# Patient Record
Sex: MALE | Race: WHITE
[De-identification: names, ages, dates, MRNs, and addresses within clinical notes are randomized per-mention and may not be internally consistent; named-entity substitution may affect disease eponyms.]

---

## 2017-09-01 ENCOUNTER — HOSPITAL ENCOUNTER (OUTPATIENT)
Dept: HOSPITAL 47 - OR | Age: 42
Setting detail: OBSERVATION
LOS: 1 days | Discharge: HOME | End: 2017-09-02
Payer: COMMERCIAL

## 2017-09-01 VITALS — BODY MASS INDEX: 34.2 KG/M2

## 2017-09-01 DIAGNOSIS — Z82.49: ICD-10-CM

## 2017-09-01 DIAGNOSIS — M24.572: ICD-10-CM

## 2017-09-01 DIAGNOSIS — M21.072: Primary | ICD-10-CM

## 2017-09-01 DIAGNOSIS — Z86.718: ICD-10-CM

## 2017-09-01 DIAGNOSIS — I25.2: ICD-10-CM

## 2017-09-01 DIAGNOSIS — G89.29: ICD-10-CM

## 2017-09-01 DIAGNOSIS — E78.5: ICD-10-CM

## 2017-09-01 DIAGNOSIS — I10: ICD-10-CM

## 2017-09-01 DIAGNOSIS — M25.872: ICD-10-CM

## 2017-09-01 DIAGNOSIS — Z79.899: ICD-10-CM

## 2017-09-01 DIAGNOSIS — M19.072: ICD-10-CM

## 2017-09-01 DIAGNOSIS — E66.9: ICD-10-CM

## 2017-09-01 DIAGNOSIS — Z87.891: ICD-10-CM

## 2017-09-01 DIAGNOSIS — G47.33: ICD-10-CM

## 2017-09-01 DIAGNOSIS — I25.10: ICD-10-CM

## 2017-09-01 DIAGNOSIS — Z91.14: ICD-10-CM

## 2017-09-01 PROCEDURE — 28300 INCISION OF HEEL BONE: CPT

## 2017-09-01 PROCEDURE — 27695 REPAIR OF ANKLE LIGAMENT: CPT

## 2017-09-01 PROCEDURE — 27687 REVISION OF CALF TENDON: CPT

## 2017-09-01 PROCEDURE — 64450 NJX AA&/STRD OTHER PN/BRANCH: CPT

## 2017-09-01 PROCEDURE — 73650 X-RAY EXAM OF HEEL: CPT

## 2017-09-01 PROCEDURE — 94640 AIRWAY INHALATION TREATMENT: CPT

## 2017-09-01 RX ADMIN — POTASSIUM CHLORIDE SCH MLS: 14.9 INJECTION, SOLUTION INTRAVENOUS at 11:15

## 2017-09-01 RX ADMIN — HYDROCODONE BITARTRATE AND ACETAMINOPHEN PRN EACH: 10; 325 TABLET ORAL at 22:38

## 2017-09-01 RX ADMIN — HYDROMORPHONE HYDROCHLORIDE PRN MG: 1 INJECTION, SOLUTION INTRAMUSCULAR; INTRAVENOUS; SUBCUTANEOUS at 23:29

## 2017-09-01 RX ADMIN — HYDROMORPHONE HYDROCHLORIDE PRN MG: 1 INJECTION, SOLUTION INTRAMUSCULAR; INTRAVENOUS; SUBCUTANEOUS at 16:33

## 2017-09-01 RX ADMIN — HYDROMORPHONE HYDROCHLORIDE PRN MG: 1 INJECTION, SOLUTION INTRAMUSCULAR; INTRAVENOUS; SUBCUTANEOUS at 20:28

## 2017-09-01 RX ADMIN — HYDROMORPHONE HYDROCHLORIDE PRN MG: 1 INJECTION, SOLUTION INTRAMUSCULAR; INTRAVENOUS; SUBCUTANEOUS at 16:56

## 2017-09-01 NOTE — FL
EXAMINATION TYPE: FL guidance operating room

 

DATE OF EXAM: 9/1/2017

 

HISTORY: Flouroscopy  time

 

51 seconds of fluoroscopy provided. 

 

IMPRESSION:

1. Fluoroscopy time.

## 2017-09-01 NOTE — P.ONQ
Anesthesiology Proc Note - PNB





- Peripheral Nerve Block Performed


  ** Left Popliteal Single


Time Out Performed: Yes


Indication: Acute Post-Operative Pain, Analgesia


Specifically requested for management of pain by DrPhillip: Jose Ramon Aguirre


Sedation Type: Sedate with meaningful contact maintained


Preparation: Sterile Prep


Position: Supine (Right lateral)


Catheter: None


Needle Types: Other (see comment) (stimuplex)


Needle Size: 50mm (2")


Needle Gauge: Other (see comment) (22)


Technique: Ultrasound


Injectate: Other (see comment) (30 cc 1% lido,.025% Rop,)


Adjunct: Epinephrine (see comment for dilution ratio) (1:200,000)


Blood Aspirated: No


Pain Paresthesia on Injection Noted: No


Resistance on Injection: Normal


Events: Uneventful and Well Tolerated

## 2017-09-01 NOTE — P.OP
Date of Procedure: 09/01/17


Preoperative Diagnosis: 


1.  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 

arthritis


2.  Has pain over valgus foot deformity with less than 50% uncoverage of the 

talar head


3.  Gastrocnemius equinus contracture


4.  Anterior ankle impingement


Postoperative Diagnosis: 


Same


Procedure(s) Performed: 


1.  Left deltoid ligament reconstruction with tibialis anterior allograft


2.  Left medializing calcaneal osteotomy


3.  Left ankle cheilectomy


4.  Left gastrocnemius recession


5.  Application of left short leg splint


Implants: 





Anesthesia: GETA


Surgeon: Jose Ramon Aguirre


Assistant #1: Jarrod Plaza


Estimated Blood Loss (ml): 25


IV fluids (ml): 1,600


Pathology: none sent


Condition: stable


Disposition: PACU


Indications for Procedure: 


The patient is a 42-year-old male with a medical history significant for prior 

DVT and heart disease who is had a long-standing history of problems with his 

left foot and ankle.  The patient has had multiple sprains resulting in chronic 

ankle pain.  He failed a long course of nonsurgical treatment including 

activity modifications, bracing, orthotics, NSAID pain medication, and therapy.

  He came to see me to discuss surgery.  The patient's wife works as an 

 and requested surgery.  I discussed that the patient has a 

difficult problem in that he is artery developing arthritic changes in the 

ankle and has an incompetent deltoid ligament.  The patient had x-rays and an 

MRI.  The patient also obtained weightbearing x-rays of the ankle and foot as 

well as a stress x-ray in my office.  The stress x-ray showed that the valgus 

ankle arthritis was passively correctable.  I do lengthy discussion with the 

patient and his wife on treatment.  We discussed continued nonsurgical 

treatment with custom bracing and orthotics as well as occasional injections 

and activity modification.  The patient and his wife requested surgery.  I 

discussed different treatment options including an ankle fusion versus joint 

sparing procedures.  Due to the patient's young age and since his x-rays were 

passively correctable on stress x-ray we discussed performing joint sparing 

procedures.  My recommendation was to perform a deltoid ligament reconstruction 

with tendon allograft, a medializing calcaneal osteotomy to correct his 

hindfoot valgus and protect his graft, and ankle cheilectomy to address his 

ankle impingement and a gastrocnemius recession.  We discussed the potential 

risks and complication of surgery including but not limited to risk of 

anesthesia, risk of superficial infection, risk of deep infection, risk of 

delayed wound healing, risk of damage to local blood vessels or nerves, risk of 

failure of the graft, risk of recurrent valgus deformity, risk of progression 

of arthritis, risk of continued pain, risk of worsening pain, risk of inability 

to regain preinjury level of function, risk of nonunion of the calcaneal 

osteotomy, risk of symptomatically hardware from the osteotomy, risk of damage 

to the sural nerve, risk of calf atrophy, generalized to satisfaction with 

surgery, DVT, fatal PE, postoperative medical complications, need for further 

surgery, amputation, and possibly death.  The patient and his wife understand 

that he is at a high risk of having ongoing pain due to his degenerative ankle 

findings.  They provided their verbal and written consent and requested I go 

forward with surgery.


Operative Findings: 





Description of Procedure: 


The patient was identified in preoperative holding and the correct left leg was 

marked with my initials.  I reviewed the consent form with the patient and his 

wife.  All of their questions were answered.  A preoperative popliteal and 

saphenous nerve block was applied by anesthesia.  The patient was then brought 

back to the operating room.  He was positioned on an operating room table and a 

general anesthetic and preoperative antibiotics were administered.  A 

tourniquet was applied to the proximal aspect of his left thigh.  All bony 

prominences were well-padded.  A bone foam bump was placed under his left 

buttock internally rotating his leg to neutral.  A ramp was placed under the 

left leg.  The right leg was secured to the operating room table with foam and 

tape.  The left leg was then prepped and draped in the standard sterile 

fashion.  Prior to starting surgery timeout was performed identifying the 

correct patient, operative extremity, and procedure.  The patient's leg was 

then elevated, exsanguinated with an Esmarch bandage, and the tourniquet was 

inflated to 250 mmHg.  Next





I began by performing a gastrocnemius recession.  I made a longitudinal 

incision 1 thumb breath posterior to the medial tibia at the distal medial 

muscle belly of the gastrocnemius.  Skin incision was made a 10 blade scalpel 

and dissection was carried down carefully to the subcutaneous tissue to the 

superficial fascia which was incised longitudinally in line with the skin 

incision.  I bluntly developed the interval between the gastrocnemius 

aponeurosis and superficial fascia as well as the gastroc aponeurosis and 

soleus fascia.  The sural nerve was identified to be adherent to the 

superficial fascia.  The plantaris tendon was identified and a 1 cm section was 

removed.  Modified right angle retractors were placed isolating the 

gastrocnemius aponeurosis.  I major that the sural nerve was protected.  I then 

transected the aponeurosis from medial to lateral under direct visualization.  

The wound was then copiously irrigated.  The deep subcu was reapproximated 

using 2-0 Vicryl.  The skin was closed with 3-0 nylon horizontal mattress 

stitches.





I then performed a medializing calcaneal osteotomy.  An oblique incision was 

marked out 1 thumb breadth posterior to the fibula over the lateral wall the 

calcaneus.  Skin incision with a 15 blade scalpel dissection was carried down 

carefully with tenotomy scissors to the periosteum over the lateral calcaneus.  

A key elevator was used to raise the soft tissue off the calcaneus.  Baby Keysha 

retractors were placed superior and inferior to the calcaneus.  A long 

microsagittal saw blade was used to cut the calcaneus taking care not to plunge 

medially.  A small lamina  was placed in the osteotomy to relax the 

soft tissues medially.  I then placed K wires for cannulated 45 screws in the 

tuberosity of the calcaneus up to the osteotomy site.  I verified the position 

of the K wires.  I then applied a medial force to the calcaneus and had an 

assistant drive the K wires across the osteotomy site.  An axial view was then 

obtained again showing medialization of the tuberosity and that the K wires 

were within bone.  I then measured and placed cannulated, partially-threaded, 

4.5 mm screws across the osteotomy site generating excellent compression.  I 

verified the position of the screws with both a an axial and lateral x-ray.





I then marked a longitudinal incision centered over the medial malleolus and 

extending along the medial border of the foot.  Skin incision was made a 15 

blade scalpel.  I dissected through the subcu changes tissue and elevated the 

periosteum off of the distal tibia.  I then proceeded my dissection distally.  

The sheath of the posterior tibial tendon was opened.  On inspection the 

posterior tibial tendon appeared to be relatively normal with minimal 

degenerative changes and no tearing.  I elected not to perform an FDL tendon 

transfer.  The posterior tibial tendon was then carefully retracted.  An 

anterior arthrotomy the ankle joint was then performed.  There appeared to be a 

large overhanging anterior plafond osteophyte as well as an osteophyte on the 

medial talar neck.  An osteotome was reduced to remove the impinging spurs.  

Both the superficial and deep limbs of the deltoid ligament appeared to be 

attenuated.  My assistant prepared the tibialis anterior tendon on the back 

table and placed it in a graft workstation to provide tension to the graft and 

help prevent postoperative creep of the tendon.  A K wire was then placed at 

the level of the physeal scar on the AP view and directly centered in the tibia 

on the lateral view.  A beath pin was then placed in the talus at the center of 

rotation of the ankle joint from medial to lateral and out the lateral skin.  A 

second Beath pin was placed in the calcaneus from the sustentaculum out the 

lateral aspect of the calcaneus into the skin.  The graft was then brought to 

the operating room table from the back table.  One end of the graft was placed 

into the tibia and a 6.25 mm interference screw was placed into the graft 

holding it into the tibia.  The graft appeared secure and I was not able to 

dislodge it from the tibia.  I then placed one of the limbs of the graft 

through the Beath pin and pulled the Beath pin laterally bringing the graft 

down into the talar bone tunnel after drilling a tunnel for the graft.  The 

foot was placed in maximum inversion and a 5.5 mm interference screw was placed 

holding the graft in place nicely.  Attention was then turned to the calcaneal 

tunnel.  The Beath pin was pulled through the lateral foot, pulling the other 

limb of the graft into the calcaneal tunnel.  Fluoroscopy was brought in to 

verify reduction of the valgus tilt.  Maximum inversion was held and a 5.5 mm 

interference screw was placed in the calcaneal tunnel holding the graft in 

place.  At this point fluoroscopy was brought in.  On a mortise view the talus 

appeared to be concentrically reduced within the ankle with no evidence of 

valgus tilt.  I applied gentle stress to the tendon and there did not appear to 

be any valgus instability.  I also performed varus stressing and there did not 

appear to be any varus stress.  At this point all wounds were copiously 

irrigated.  The sheath of the posterior tibial tendon was closed with a running 

0 Vicryl stitch.  The subcutaneous layer of all incisions were closed with 

interrupted 2-0 Vicryl.  Skin of all incisions were closed using 3-0 nylon 

horizontal mattress stitches.  The stab incisions on the plantar aspect of the 

heel were closed with interrupted 3-0 nylon.  A sterile dressing consisting of 

Betadine soaked Adaptic, 4 x 4, and web roll was applied.  The drapes were 

taken down and a very well-padded bulky Adkins type splint was applied.  The 

patient was then awoken from his anesthetic, transferred to the operative table 

to the [facility] and brought to PACU having tolerated the procedure well.





Jarrod SPANGLER was required as a skilled assistant for patient positioning, 

approach, preparation of the graft, placement of the graft, closure, and 

application of splint.





Plan: The patient is going to be admitted overnight for pain control and 

postoperative antibiotics due to the fact that he lives over 2 hours away.  He 

is to remain strictly nonweightbearing on his left leg in his splint at all 

times.  Due to the patient having a history of prior DVT he is going to be 

treated with Lovenox.  He was given a prescription for Lovenox to go home with 

and we'll contact his cardiologist and internist to see how long they would 

like him treated.  He will follow-up in the office in 2 weeks for splint and 

suture removal and x-rays of the left ankle including an axial heel view.

## 2017-09-01 NOTE — XR
EXAMINATION TYPE: XR calcaneus 2V LT

 

DATE OF EXAM: 9/1/2017

 

COMPARISON: NONE

 

HISTORY: Intraoperative image calcaneal fracture

 

TECHNIQUE: One view submitted

 

FINDINGS: Surgical change appears in near-anatomic alignment. There is some residual displacement of 
the posterior margin the calcaneus.

 

IMPRESSION: Intraoperative image

## 2017-09-02 VITALS — SYSTOLIC BLOOD PRESSURE: 120 MMHG | DIASTOLIC BLOOD PRESSURE: 65 MMHG | HEART RATE: 101 BPM

## 2017-09-02 VITALS — RESPIRATION RATE: 16 BRPM

## 2017-09-02 VITALS — TEMPERATURE: 98 F

## 2017-09-02 RX ADMIN — HYDROMORPHONE HYDROCHLORIDE PRN MG: 1 INJECTION, SOLUTION INTRAMUSCULAR; INTRAVENOUS; SUBCUTANEOUS at 02:32

## 2017-09-02 RX ADMIN — POTASSIUM CHLORIDE SCH: 14.9 INJECTION, SOLUTION INTRAVENOUS at 05:04

## 2017-09-02 RX ADMIN — OXYCODONE HYDROCHLORIDE AND ACETAMINOPHEN PRN EACH: 10; 325 TABLET ORAL at 12:23

## 2017-09-02 RX ADMIN — HYDROMORPHONE HYDROCHLORIDE PRN MG: 1 INJECTION, SOLUTION INTRAMUSCULAR; INTRAVENOUS; SUBCUTANEOUS at 05:31

## 2017-09-02 RX ADMIN — OXYCODONE HYDROCHLORIDE AND ACETAMINOPHEN PRN EACH: 10; 325 TABLET ORAL at 08:45

## 2017-09-02 RX ADMIN — HYDROCODONE BITARTRATE AND ACETAMINOPHEN PRN EACH: 10; 325 TABLET ORAL at 03:26

## 2017-09-02 RX ADMIN — HYDROCODONE BITARTRATE AND ACETAMINOPHEN PRN EACH: 10; 325 TABLET ORAL at 07:22

## 2017-09-02 NOTE — P.DS
Providers


Date of admission: 


09/02/17 06:42





Attending physician: 


Jose Ramon Aguirre





Consults: 





 





09/01/17 20:38


Consult Physician Routine 


   Consulting Provider: Dharmesh Curiel


   Consult Reason/Comments: medical management


   Do you want consulting provider notified?: Yes, Notify in am











Primary care physician: 


Luís Aldana MD





Hospital Course: 





The patient is a very pleasant 42-year-old male who underwent a left foot 

deltoid ligament reconstruction, left ankle cheilectomy, left medializing 

calcaneal osteotomy and left gastrocnemius recession on Friday, 09/01/2017.  

Following an uncomplicated surgery the patient was transferred to the 

orthopedic floor overnight for pain control.  On postoperative day #1 his only 

complaint is pain in the left ankle and heel.  He denies chest pain or 

shortness of breath.  On exam he had a clean-appearing bulky Adkins splint with 

no saturated bleeding.  His toes were warm and well perfused with brisk 

capillary refill.  His pain medication was increased to Percocet and he was 

cleared for discharge home.





Plan - Discharge Summary


New Discharge Prescriptions: 


New


   Docusate [Colace] 100 mg PO BID #60 capsule


   HYDROcodone/APAP 10-325MG [Norco ] 1 tab PO Q4HR PRN #60 tab


     PRN Reason: Pain


   Enoxaparin [Lovenox] 40 mg SQ DAILY #14 syringe


   oxyCODONE HCL/ACETAMINOPHEN [Percocet  mg] 1 tab PO Q4HR PRN #75 tab


     PRN Reason: Pain





No Action


   Pravastatin Sodium [Pravachol] 20 mg PO DAILY


   Aspirin [Adult Low Dose Aspirin EC] 81 mg PO DAILY


   Metoprolol Tartrate [Lopressor] 25 mg PO DAILY


   Pantoprazole Sodium [Protonix] 20 mg PO DAILY


Discharge Medication List





Aspirin [Adult Low Dose Aspirin EC] 81 mg PO DAILY 08/23/17 [History]


Metoprolol Tartrate [Lopressor] 25 mg PO DAILY 08/23/17 [History]


Pantoprazole Sodium [Protonix] 20 mg PO DAILY 08/23/17 [History]


Pravastatin Sodium [Pravachol] 20 mg PO DAILY 08/23/17 [History]


Docusate [Colace] 100 mg PO BID #60 capsule 09/01/17 [Rx]


Enoxaparin [Lovenox] 40 mg SQ DAILY #14 syringe 09/01/17 [Rx]


HYDROcodone/APAP 10-325MG [Norco ] 1 tab PO Q4HR PRN #60 tab 09/01/17 [Rx]


oxyCODONE HCL/ACETAMINOPHEN [Percocet  mg] 1 tab PO Q4HR PRN #75 tab 09/02 /17 [Rx]








Follow up Appointment(s)/Referral(s): 


Jose Ramon Aguirre MD [Medical Doctor] - 2 Weeks


Activity/Diet/Wound Care/Special Instructions: 


Take meds as directed


F/U with Dr. Aguirre


Maintain splint, keep clean and dry


Non weightbearing, left leg

## 2017-09-02 NOTE — P.CONS
History of Present Illness





- Reason for Consult


Consult date: 09/02/17


Requesting physician: Jose Ramon Aguirre





- History of Present Illness





I came to see the patient  the consults .  In the meantime patient is already 

discharged from the floor.  I did not get to see the patient.





Past Medical History


Past Medical History: GERD/Reflux, Hyperlipidemia, Myocardial Infarction (MI)


Additional Past Medical History / Comment(s): wears lt ankle brace


Last Myocardial Infarction Date:: 2016


History of Any Multi-Drug Resistant Organisms: None Reported


Past Surgical History: Heart Catheterization, Orthopedic Surgery


Additional Past Surgical History / Comment(s): rt knee:ACL,MCL meniscus repair.


Past Anesthesia/Blood Transfusion Reactions: Family History of Problems w/ 

Anesthesia


Additional Past Anesthesia/Blood Transfusion Reaction / Comm: states mother 

experienced anyphlactic shock after anesthesia


Past Psychological History: No Psychological Hx Reported


Smoking Status: Former smoker


Past Alcohol Use History: Rare


Additional Past Alcohol Use History / Comment(s): smoker 10-12 years  1/2ppd 

quit 2016


Past Drug Use History: None Reported





- Past Family History


  ** Mother


Additional Family Medical History / Comment(s): hx "blood clots in her groin 

and stomach"





Medications and Allergies


 Home Medications











 Medication  Instructions  Recorded  Confirmed  Type


 


Aspirin [Adult Low Dose Aspirin EC] 81 mg PO Q48H 08/23/17 09/02/17 History


 


Metoprolol Tartrate [Lopressor] 12.5 mg PO BID 08/23/17 09/02/17 History


 


Pantoprazole Sodium [Protonix] 20 mg PO DAILY 08/23/17 09/02/17 History


 


Pravastatin Sodium [Pravachol] 20 mg PO DAILY 08/23/17 09/02/17 History


 


Docusate [Colace] 100 mg PO BID #60 capsule 09/01/17  Rx


 


Enoxaparin [Lovenox] 40 mg SQ DAILY #14 syringe 09/01/17  Rx


 


Albuterol Inhaler [Ventolin Hfa 2 puff INHALATION RT-QID PRN 09/02/17 09/02/17 

History





Inhaler]    


 


oxyCODONE HCL/ACETAMINOPHEN 1 tab PO Q4HR PRN #75 tab 09/02/17  Rx





[Percocet  mg]    











 Allergies











Allergy/AdvReac Type Severity Reaction Status Date / Time


 


No Known Allergies Allergy   Verified 08/23/17 15:39














Physical Exam


Vitals: 


 Vital Signs











  Temp Pulse Pulse Pulse Pulse Resp BP


 


 09/02/17 07:00  98 F    101 H   16  120/65


 


 09/02/17 04:00       16 


 


 09/02/17 03:52  98 F     94  17  129/63


 


 09/02/17 00:00       16 


 


 09/01/17 22:29   80     


 


 09/01/17 22:22   84     


 


 09/01/17 20:27       16  134/86


 


 09/01/17 20:00       16 


 


 09/01/17 19:30      92  16  128/80


 


 09/01/17 19:15      81  16  122/77


 


 09/01/17 19:00      85  16  122/74


 


 09/01/17 18:45      96  16  122/75


 


 09/01/17 18:30      71  16  123/77


 


 09/01/17 18:15       16  124/79


 


 09/01/17 18:00      85  16  123/76


 


 09/01/17 17:30  98.2 F     92  16  125/78


 


 09/01/17 16:58    82    16  136/86


 


 09/01/17 16:42    91    16  143/87


 


 09/01/17 16:27    84    16  142/82


 


 09/01/17 16:12    83    16  141/78


 


 09/01/17 15:57    77    16  146/80


 


 09/01/17 15:42  98.9 F   93    14  141/79














  Pulse Ox


 


 09/02/17 07:00  94 L


 


 09/02/17 04:00 


 


 09/02/17 03:52  94 L


 


 09/02/17 00:00 


 


 09/01/17 22:29 


 


 09/01/17 22:22 


 


 09/01/17 20:27  94 L


 


 09/01/17 20:00 


 


 09/01/17 19:30  97


 


 09/01/17 19:15  95


 


 09/01/17 19:00  96


 


 09/01/17 18:45  93 L


 


 09/01/17 18:30  93 L


 


 09/01/17 18:15  90 L


 


 09/01/17 18:00  92 L


 


 09/01/17 17:30  94 L


 


 09/01/17 16:58  92 L


 


 09/01/17 16:42  94 L


 


 09/01/17 16:27  94 L


 


 09/01/17 16:12  99


 


 09/01/17 15:57  98


 


 09/01/17 15:42  98








 Intake and Output











 09/01/17 09/02/17 09/02/17





 22:59 06:59 14:59


 


Intake Total 1800 600 180


 


Output Total 


 


Balance 1200 150 -1200


 


Intake:   


 


   400 


 


    Lactated Ringers 1,000 ml  400 





    @ 50 mls/hr IV .Q20H [facility]   





    Rx#:[id number]   


 


  Oral 1100 200 180


 


Output:   


 


  Urine 


 


Other:   


 


  Voiding Method Urinal  Toilet


 


  Weight 117.48 kg

## 2018-03-16 ENCOUNTER — HOSPITAL ENCOUNTER (OUTPATIENT)
Dept: HOSPITAL 47 - OR | Age: 43
Discharge: HOME | End: 2018-03-16
Payer: COMMERCIAL

## 2018-03-16 VITALS — HEART RATE: 89 BPM | SYSTOLIC BLOOD PRESSURE: 131 MMHG | DIASTOLIC BLOOD PRESSURE: 79 MMHG

## 2018-03-16 VITALS — RESPIRATION RATE: 16 BRPM

## 2018-03-16 VITALS — BODY MASS INDEX: 34.5 KG/M2

## 2018-03-16 VITALS — TEMPERATURE: 97.2 F
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
## 2018-03-16 DIAGNOSIS — S86.312A: ICD-10-CM

## 2018-03-16 DIAGNOSIS — M67.874: Primary | ICD-10-CM

## 2018-03-16 DIAGNOSIS — M19.072: ICD-10-CM

## 2018-03-16 DIAGNOSIS — Z79.891: ICD-10-CM

## 2018-03-16 DIAGNOSIS — M21.962: ICD-10-CM

## 2018-03-16 DIAGNOSIS — E78.5: ICD-10-CM

## 2018-03-16 DIAGNOSIS — Z98.890: ICD-10-CM

## 2018-03-16 DIAGNOSIS — I11.9: ICD-10-CM

## 2018-03-16 DIAGNOSIS — Z87.891: ICD-10-CM

## 2018-03-16 DIAGNOSIS — Z79.82: ICD-10-CM

## 2018-03-16 DIAGNOSIS — Z86.718: ICD-10-CM

## 2018-03-16 DIAGNOSIS — Z79.899: ICD-10-CM

## 2018-03-16 DIAGNOSIS — G47.33: ICD-10-CM

## 2018-03-16 DIAGNOSIS — I25.2: ICD-10-CM

## 2018-03-16 DIAGNOSIS — K21.9: ICD-10-CM

## 2018-03-16 PROCEDURE — 93005 ELECTROCARDIOGRAM TRACING: CPT

## 2018-03-16 PROCEDURE — 27675 REPAIR LOWER LEG TENDONS: CPT

## 2018-03-16 PROCEDURE — 73600 X-RAY EXAM OF ANKLE: CPT

## 2018-03-16 NOTE — P.ONQ
Anesthesiology Proc Note - PNB





- Peripheral Nerve Block Performed


  ** Left Popliteal Single


Time Out Performed: Yes


Procedure Start Time: 12:15


Indication: Acute Post-Operative Pain, Analgesia


Specifically requested for management of pain by DrPhillip: Jose Ramon Aguirre


Sedation Type: Sedate with meaningful contact maintained


Preparation: Sterile Prep


Position: Supine (Right Lateral)


Catheter: None


Needle Types: Other (see comment) (pajunk)


Needle Size: 50mm (2")


Needle Gauge: 21


Technique: Ultrasound


Injectate: 0.5% Ropivacaine (see comment for volume) (30)


Blood Aspirated: No


Pain Paresthesia on Injection Noted: No


Resistance on Injection: Normal


Events: Uneventful and Well Tolerated

## 2018-03-16 NOTE — P.OP
Date of Procedure: 03/16/18


Preoperative Diagnosis: 


1.  Left ankle peroneal tendon dislocation


Postoperative Diagnosis: 


1.  Left peroneal tendon dislocation


2.  Left peroneus longus tendon tear


3.  Left low-lying peroneus brevis muscle belly


4.  Left ankle arthritis


Procedure(s) Performed: 


1.  Repair of SPR for dislocating peroneal tendons


2.  Repair of peroneus longus tear


3.  Excision of low-lying peroneus brevis muscle belly


Anesthesia: GETA


Surgeon: Jose Ramon Aguirre


Assistant #1: Jarrod Plaza


Estimated Blood Loss (ml): 10


IV fluids (ml): 700


Pathology: none sent


Condition: stable


Disposition: PACU


Indications for Procedure: 


The patient is a very pleasant 42-year-old male who is well-known to my 

practice.  The patient has had a long-standing history of problems with his 

left ankle.  He previously underwent a left gastroc recession, deltoid ligament 

reconstruction, and medializing calcaneal osteotomy for valgus ankle arthritis.

  He had some recurrence of his deformity but otherwise healed and was doing 

well from that surgery.  He went on to develop pain along the lateral border of 

his leg.  He also developed an uncomfortable popping sensation over his lateral 

malleolus.  Clinically his peroneal tendons were subluxed out of the peroneal 

groove.  He initially was managed with physical therapy and ankle bracing.  He 

got to the point that he failed to improve with nonsurgical treatment.  I do 

lengthy discussion with the patient and his wife on treatment options.  The 

patient requested surgery.  The patient acknowledged and understands that he 

has arthritis and deformity in the ankle.  I was initially hesitant to perform 

an isolated peroneal tendon repair in light of the patient's arthritis, but the 

patient was adamant that he wanted elective peroneal tendon surgery.  The 

patient's wife physical therapy office and agreed that most of the symptoms 

were treatable to his peroneal tendons subluxing and causing discomfort over 

the lateral malleolus.  I agreed to perform an isolated peroneal tendon repair 

and the patient acknowledged the fact that he may still have discomfort and 

disability due to his underlying ankle arthritis.  We discussed the potential 

risks and complication of surgery including but not limited to risk of 

anesthesia, superficial infection, deep infection, damage to local blood 

vessels or nerves, intraoperative fracture, postoperative fracture, recurrent 

instability, ongoing pain, chronic swelling, inability to regain preinjury 

level of function, generalized to satisfaction with surgery, DVT, PE, other 

medical complications and possibly loss of life or limb.  The patient 

acknowledged and understands that he may still have pain due to his arthritis 

and provided his consent to go forward with an isolated peroneal tendon repair.


Operative Findings: 


Both the peroneus longus and brevis were dislocated out of the retro-fibular 

groove.  The superior peroneal retinaculum was partially torn off the fibula 

and there was a pouch over the lateral malleolus.  There is a longitudinal tear 

in the peroneus longus and a low-lying muscle belly of the peroneus brevis.  

The retro-fibular groove was concave and did not require a groove deepening.


Description of Procedure: 


The patient was identified





Holding and the correct left leg was marked with my initials.  I reviewed the 

consent form with the patient and his wife and all the questions were answered.

  Popliteal nerve block was placed.  The patient was then brought back to the 

operating room by anesthesia.  He was positioned on the OR table where general 

anesthetic and preoperative antibiotics were administered.  A tourniquet was 

applied to the proximal aspect of the left leg.  The right leg was secured to 

the table with foam and tape.  A bump was placed on the left buttock.  A ramp 

was placed under the left leg.  The left leg was then prepped and draped in the 

standard sterile fashion.  Prior to starting surgery timeout was performed 

identifying the correct patient, operative extremity, and procedure.  The 

patient's leg was then elevated, exsanguinated with an Esmarch bandage, the 

tourniquet was inflated to 250 mmHg.





Incision over the distal fibula, directly over the fibula proximally and angled 

45 distally at the tip of the fibula.  Skin incision was made with a 15 blade 

scalpel.  Dissection was carried down carefully to subcutaneous tissue with 

tenotomy scissors.  The peroneal tendon sheath was identified and incised, 

leaving a small cuff of tissue on the fibula.  The superior peroneal 

retinaculum and periosteum was elevated off of the fibula and there was a pouch 

directly over the lateral malleolus.  Both the peroneus longus and brevis were 

dislocated into this potential space.  The synovial lining of this potential 

space was debrided and the periosteum and retinaculum was elevated off the 

remainder of the distal fibula.  There was a longitudinal tear in the peroneus 

longus.  The degenerative fibers of the peroneus longus were debrided sharply.  

After debriding the tear there was greater than 50% of the diameter remaining.  

The remaining fibers the peroneus longus were tubularized.  There is also a low-

lying peroneus brevis which was sharply debrided.  Inflamed adventitial tissue 

within the retro-fibular groove was excised.  The retro-fibular groove was 

found to be concave so a groove deepening was not needed.  The lateral 

malleolus was roughened with a rasp.  Drill holes were made in the lateral 

malleolus to repair the retinaculum to.  A 2 mm x 4 mm graft jacket was then 

used to help augment the retinacular tissue.  #2 FiberWire was placed through 

the drill holes, grasping the retinaculum and graft jacket and then passed back 

through the bone tunnels.  2 limbs of suture were passed in this manner and 

then tied over the bone bridge, nicely re-creating the retinaculum.  The 

periosteal layer that had been raised over the fibula was then brought back to 

bone through a drill tunnel using 2-0 FiberWire.  The remaining redundant 

tissue was used to reinforce the peroneal retinacular repair.  After the repair 

had been completed both tendons were within the groove.  I was unable to 

dislocate the tendons manually with a [place] elevator.  The leg was brought 

through range of motion and I was unable to dislocate the peroneal tendons.  

The wound was copiously irrigated.  The remaining retinacular tissue was closed 

with a running 0 Vicryl.  The deep subcu was reapproximated using 2-0 Vicryl.  

The skin was closed using staples.  I verified that all instrument, sponge, and 

sharp counts were correct.  The patient had a sterile dressing consisting of 

Adaptic, 4 x 4, and web roll.  The drapes were taken down and well-padded bulky 

Adkins splint was placed.  The patient was awoken from his anesthetic, 

transferred to a gurney and brought to PACU without procedure well.





Jarrod Plaza PA-C was required is a skilled assistant for patient positioning, 

surgical exposure, closure of wound, and application of splint.  Next





Plan the patient is going to be discharged home as an outpatient.  He is to 

remain strictly nonweightbearing on his left leg.  He is to leave his splint on 

at all times.  Due to his cardiac history will be discharged home on a one-week 

course of Lovenox.

## 2018-03-18 NOTE — FL
EXAMINATION TYPE: FL guidance operating room

 

DATE OF EXAM: 3/16/2018

 

CLINICAL HISTORY: Left ankle pain

 

TECHNIQUE: Fluoroscopy documentation.

 

COMPARISON:  Limited left ankle radiograph of the same date.

 

FINDINGS/IMPRESSION:  Fluoroscopic guidance was provided during procedure performed by Dr. Aguirre. 
 A total of 3 seconds of fluoroscopic time was utilized during the procedure and1 spot images was acq
uired on the limited left ankle radiograph of the same date.

## 2020-03-10 ENCOUNTER — APPOINTMENT (OUTPATIENT)
Dept: URBAN - NONMETROPOLITAN AREA CLINIC 50 | Age: 45
Setting detail: DERMATOLOGY
End: 2020-03-10

## 2020-03-10 DIAGNOSIS — L43.8 OTHER LICHEN PLANUS: ICD-10-CM

## 2020-03-10 PROCEDURE — 99201: CPT

## 2020-03-10 PROCEDURE — OTHER PRESCRIPTION: OTHER

## 2020-03-10 PROCEDURE — OTHER COUNSELING: OTHER

## 2020-03-10 RX ORDER — HALOBETASOL PROPIONATE 0.5 MG/G
CREAM TOPICAL
Qty: 1 | Refills: 1 | Status: ERX | COMMUNITY
Start: 2020-03-10

## 2020-11-05 ENCOUNTER — RX ONLY (RX ONLY)
Age: 45
End: 2020-11-05

## 2020-11-05 RX ORDER — HALOBETASOL PROPIONATE 0.5 MG/G
CREAM TOPICAL
Qty: 1 | Refills: 1 | Status: ERX

## 2023-10-10 ENCOUNTER — APPOINTMENT (OUTPATIENT)
Dept: URBAN - NONMETROPOLITAN AREA CLINIC 60 | Age: 48
Setting detail: DERMATOLOGY
End: 2023-10-10

## 2023-10-10 VITALS — WEIGHT: 266 LBS | HEIGHT: 72 IN

## 2023-10-10 DIAGNOSIS — L81.5 LEUKODERMA, NOT ELSEWHERE CLASSIFIED: ICD-10-CM

## 2023-10-10 DIAGNOSIS — L82.1 OTHER SEBORRHEIC KERATOSIS: ICD-10-CM

## 2023-10-10 PROCEDURE — OTHER MIPS QUALITY: OTHER

## 2023-10-10 PROCEDURE — 99202 OFFICE O/P NEW SF 15 MIN: CPT

## 2023-10-10 PROCEDURE — OTHER COUNSELING: OTHER

## 2023-10-10 ASSESSMENT — LOCATION DETAILED DESCRIPTION DERM
LOCATION DETAILED: RIGHT ANTERIOR DISTAL UPPER ARM
LOCATION DETAILED: RIGHT ANTERIOR LATERAL DISTAL UPPER ARM
LOCATION DETAILED: RIGHT ANTERIOR LATERAL PROXIMAL UPPER ARM

## 2023-10-10 ASSESSMENT — LOCATION ZONE DERM: LOCATION ZONE: ARM

## 2023-10-10 ASSESSMENT — LOCATION SIMPLE DESCRIPTION DERM: LOCATION SIMPLE: RIGHT UPPER ARM

## 2023-12-07 ENCOUNTER — RX ONLY (RX ONLY)
Age: 48
End: 2023-12-07

## 2023-12-07 RX ORDER — HALOBETASOL PROPIONATE 0.5 MG/G
CREAM TOPICAL
Qty: 50 | Refills: 3 | Status: ERX | COMMUNITY
Start: 2023-12-07

## 2025-02-25 ENCOUNTER — APPOINTMENT (OUTPATIENT)
Dept: URBAN - METROPOLITAN AREA CLINIC 234 | Age: 50
Setting detail: DERMATOLOGY
End: 2025-02-25

## 2025-02-25 DIAGNOSIS — L72.0 EPIDERMAL CYST: ICD-10-CM

## 2025-02-25 PROCEDURE — OTHER PHOTO-DOCUMENTATION: OTHER

## 2025-02-25 PROCEDURE — 99212 OFFICE O/P EST SF 10 MIN: CPT

## 2025-02-25 PROCEDURE — OTHER ORDER TESTS: OTHER

## 2025-02-25 PROCEDURE — OTHER PRESCRIPTION: OTHER

## 2025-02-25 PROCEDURE — OTHER COUNSELING: OTHER

## 2025-02-25 RX ORDER — MINOCYCLINE HYDROCHLORIDE 100 MG/1
CAPSULE ORAL
Qty: 28 | Refills: 0 | Status: ERX | COMMUNITY
Start: 2025-02-25

## 2025-02-25 RX ORDER — MUPIROCIN 20 MG/G
OINTMENT TOPICAL
Qty: 22 | Refills: 1 | Status: ERX | COMMUNITY
Start: 2025-02-25

## 2025-02-25 ASSESSMENT — LOCATION SIMPLE DESCRIPTION DERM: LOCATION SIMPLE: LEFT LIP

## 2025-02-25 ASSESSMENT — LOCATION ZONE DERM: LOCATION ZONE: LIP

## 2025-02-25 ASSESSMENT — LOCATION DETAILED DESCRIPTION DERM: LOCATION DETAILED: LEFT LOWER CUTANEOUS LIP

## 2025-02-25 NOTE — PROCEDURE: ORDER TESTS
Billing Type: Third-Party Bill
Bill For Surgical Tray: no
Performing Laboratory: [id number]
Lab Facility: [id number]
Expected Date Of Service: 02/25/2025